# Patient Record
Sex: MALE | Race: WHITE
[De-identification: names, ages, dates, MRNs, and addresses within clinical notes are randomized per-mention and may not be internally consistent; named-entity substitution may affect disease eponyms.]

---

## 2017-10-14 ENCOUNTER — HOSPITAL ENCOUNTER (EMERGENCY)
Dept: HOSPITAL 74 - FER | Age: 32
Discharge: HOME | End: 2017-10-14
Payer: COMMERCIAL

## 2017-10-14 VITALS — DIASTOLIC BLOOD PRESSURE: 90 MMHG | HEART RATE: 89 BPM | TEMPERATURE: 98.9 F | SYSTOLIC BLOOD PRESSURE: 137 MMHG

## 2017-10-14 VITALS — BODY MASS INDEX: 34.9 KG/M2

## 2017-10-14 DIAGNOSIS — K52.9: Primary | ICD-10-CM

## 2017-10-14 LAB
ALBUMIN SERPL-MCNC: 4.7 G/DL (ref 3.5–5)
ALP SERPL-CCNC: 47 U/L (ref 32–92)
ALT SERPL-CCNC: 31 U/L (ref 10–40)
ANION GAP SERPL CALC-SCNC: 8 MMOL/L (ref 8–16)
AST SERPL-CCNC: 21 U/L (ref 10–42)
BASOPHILS # BLD: 2.9 % (ref 0–2)
BILIRUB SERPL-MCNC: 0.6 MG/DL (ref 0.2–1)
CALCIUM SERPL-MCNC: 9.3 MG/DL (ref 8.4–10.2)
CK SERPL-CCNC: 186 IU/L (ref 39–308)
CO2 SERPL-SCNC: 23 MMOL/L (ref 22–28)
COLOR UR: (no result)
CREAT SERPL-MCNC: 1 MG/DL (ref 0.6–1.3)
DEPRECATED RDW RBC AUTO: 13 % (ref 11.9–15.9)
EOSINOPHIL # BLD: 0.1 % (ref 0–4.5)
GLUCOSE SERPL-MCNC: 142 MG/DL (ref 74–106)
MCH RBC QN AUTO: 28.7 PG (ref 25.7–33.7)
MCHC RBC AUTO-ENTMCNC: 33.8 G/DL (ref 32–35.9)
MCV RBC: 85 FL (ref 80–96)
NEUTROPHILS # BLD: 80.2 % (ref 42.8–82.8)
PH UR: 7 [PH] (ref 4.5–8)
PLATELET # BLD AUTO: 183 K/MM3 (ref 134–434)
PMV BLD: 8.9 FL (ref 7.5–11.1)
PROT SERPL-MCNC: 7.9 G/DL (ref 6.4–8.3)
SP GR UR: 1.02 (ref 1–1.02)
TROPONIN I SERPL-MCNC: < 0.03 NG/ML (ref 0.03–0.5)
UROBILINOGEN UR STRIP-MCNC: 0.2 MG/DL (ref 0.2–1)
WBC # BLD AUTO: 8.5 K/MM3 (ref 4–10.8)

## 2017-10-14 PROCEDURE — 3E033GC INTRODUCTION OF OTHER THERAPEUTIC SUBSTANCE INTO PERIPHERAL VEIN, PERCUTANEOUS APPROACH: ICD-10-PCS

## 2017-10-14 PROCEDURE — 3E033NZ INTRODUCTION OF ANALGESICS, HYPNOTICS, SEDATIVES INTO PERIPHERAL VEIN, PERCUTANEOUS APPROACH: ICD-10-PCS

## 2017-10-14 NOTE — PDOC
History of Present Illness





- General


Chief Complaint: Chest Pain


Stated Complaint: CHEST PAIN


Time Seen by Provider: 10/14/17 11:53





- History of Present Illness


Initial Comments: 


10/14/17 13:12


Chief complaint: Epigastric pain





History of present illness: Patient complains of severe epigastric pain since 

this morning described as a "burning" just below the xiphoid. This extends to 

the right upper quadrant. There is mild nausea, no vomiting, and a normal bowel 

movement occurred this morning. There is been no recent hematemesis, melena, or 

bloody stool. The patient has a history of severe GERD, is under the care of 

her gastroenterologist, but has required parenteral analgesics in the past for 

this condition.





Review of systems: Denies chest pain, shortness of breath, diaphoresis, 

radiation of pain to the shoulders arms or jaw, vomiting, diarrhea, constipation

, hematemesis, melena, bloody stool, urinary tract symptoms, visual or focal 

neurologic symptoms, unsteadiness of gait. Remainder systems reviewed and found 

to be negative





Past medical history: GERD, as noted above. Otherwise healthy. Has had GI 

evaluation including gallbladder sono, HIDA scan, CT scans, and endoscopy in 

the past without definite diagnosis made.





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 

drugs. Fully active without disability





Family history: Significant for coronary artery disease and diabetes.





Physical exam: Alert and oriented 3, well-developed well-nourished, moderate 

distress due to epigastric pain. Cooperative


Afebrile, vital signs normal


No pallor or icterus. PERRLA, fundi benign, ENT clear.


Neck supple without bruit mass or nodes


Lungs clear to P&A


CV regular without murmur rub or gallop pulses full and symmetric no JVD or 

edema


Abdomen mildly distended, soft without organomegaly. There is moderate 

tenderness in the epigastrium and right upper quadrant with a positive Aragon 

sign. There is no lower quadrant tenderness and no CVAT


Neurological intact. Gait stable and unimpaired


Skin turgor adequate, no rash, mucous membranes wet


Extremities no CCE





Impression: Probable severe gastritis, GERD, rule out gallbladder or liver 

disease





Plan: Analgesics, labs for further diagnosis, ultrasound of the gallbladder, 

and further evaluation depending on results.





10/14/17 14:40








Past History





- Past Medical History


Allergies/Adverse Reactions: 


 Allergies











Allergy/AdvReac Type Severity Reaction Status Date / Time


 


No Known Allergies Allergy   Verified 10/14/17 11:43











Home Medications: 


Ambulatory Orders





Acetaminophen W/ Codeine #3 [Tylenol # 3] 2 combo PO Q4H PRN #14 tablet MDD 8 10

/14/17 








GI Disorders: Yes (GERD)


Kidney Stones: Yes





- Immunization History


Immunization Up to Date: No





- Suicide/Smoking/Psychosocial Hx


Smoking History: Never smoked


Have you smoked in the past 12 months: No


Hx Alcohol Use: No


Drug/Substance Use Hx: No


Substance Use Type: None





*Physical Exam





- Vital Signs


 Last Vital Signs











Temp Pulse Resp BP Pulse Ox


 


 98.9 F   89   18   137/90   97 


 


 10/14/17 11:40  10/14/17 11:40  10/14/17 11:40  10/14/17 11:40  10/14/17 11:40














ED Treatment Course





- LABORATORY


CBC & Chemistry Diagram: 


 10/14/17 12:10





 10/14/17 12:10





- ADDITIONAL ORDERS


Additional order review: 


 Laboratory  Results











  10/14/17 10/14/17





  13:30 12:10


 


Sodium   136


 


Potassium   3.7


 


Chloride   105


 


Carbon Dioxide   23


 


Anion Gap   8


 


BUN   15


 


Creatinine   1.0


 


Creat Clearance w eGFR   > 60


 


Random Glucose   142 H


 


Calcium   9.3


 


Total Bilirubin   0.6


 


AST   21


 


ALT   31


 


Alkaline Phosphatase   47


 


Creatine Kinase   186


 


Creatine Kinase Index   1.7


 


CK-MB (CK-2)   3.2


 


Troponin I   < 0.03 L


 


Total Protein   7.9


 


Albumin   4.7


 


Urine Color  [place] 


 


Urine Appearance  Clear 


 


Urine pH  7.0 


 


Ur Specific Gravity  1.020 


 


Urine Protein  Negative 


 


Urine Glucose (UA)  Negative 


 


Urine Ketones  Negative 


 


Urine Blood  Negative 


 


Urine Nitrite  Negative 


 


Urine Bilirubin  Negative 


 


Urine Urobilinogen  0.2 


 


Ur Leukocyte Esterase  Negative 








 











  10/14/17





  12:10


 


RBC  5.28


 


MCV  85.0


 


MCHC  33.8


 


RDW  13.0


 


MPV  8.9


 


Neutrophils %  80.2


 


Lymphocytes %  13.5


 


Monocytes %  3.3 L


 


Eosinophils %  0.1


 


Basophils %  2.9 H














- RADIOLOGY


Radiology Studies Ordered: 














 Category Date Time Status


 


 ABDOMEN FLAT & UPRIGHT [RAD] Stat Radiology  10/14/17 15:03 Completed


 


 ABDOMEN US -LIMITED [US] Stat Ultrasound  10/14/17 12:24 Completed














- Medications


Given in the ED: 


ED Medications














Discontinued Medications














Generic Name Dose Route Start Last Admin





  Trade Name Freq  PRN Reason Stop Dose Admin


 


Hyoscyamine Sulfate  0.125 mg 10/14/17 14:17 10/14/17 14:20





  Levsin Odt -  PO 10/14/17 14:18  0.125 mg





  ONCE ONE   Administration


 


Pantoprazole Sodium 40 mg/  100 mls @ 200 mls/hr 10/14/17 11:56 10/14/17 12:12





  Sodium Chloride  IVPB 10/14/17 12:25  200 mls/hr





  ONCE ONE   Administration


 


Morphine Sulfate  6 mg 10/14/17 12:21 10/14/17 12:25





  Morphine Injection -  IVPUSH 10/14/17 12:22  6 mg





  ONCE ONE   Administration


 


Morphine Sulfate  4 mg 10/14/17 14:51 10/14/17 14:57





  Morphine Injection -  IVPUSH 10/14/17 14:52  4 mg





  ONCE ONE   Administration


 


Ondansetron HCl  4 mg 10/14/17 12:00 10/14/17 12:00





  Zofran Injection  IVPB 10/14/17 12:01  4 mg





  ONCE ONE   Administration














Medical Decision Making





- Medical Decision Making


10/14/17 13:19


White blood count normal. Remainder of CBC normal. Chemistries with a glucose 

of 142, normal LFTs and no other significant abnormalities. Troponin is 

negative.





Ultrasound pending. Patient is much more comfortable after Zofran, Protonix, 

and morphine.





10/14/17 14:40


Ultrasound shows multiple small gallstones, but no sign of acute cholecystitis. 

Biliary colic is still an outside possibility. No other abnormalities are seen.





The patient remains distended and suspect that the nature of the patient's pain 

is due to incomplete emptying the stomach and stomach distention. NG tube was 

recommended, but the patient refused. He has received morphine with partial 

relief.





10/14/17 14:42


EKG reveals normal sinus rhythm 90 per minute. There is a nonspecific ST-T wave 

abnormality in the inferior leads that have been present since at least 

September, 2015 on an old EKG. There is  incomplete right bundle-branch block 

which was also present on prior EKG. EKG is essentially unchanged. Troponin, as 

noted above, is negative. The patient symptoms are not consistent with cardiac 

disease.





10/14/17 16:13


Patient is feeling much better. Pain is resolving. The distention and right 

upper quadrant tenderness has subsided. He is instructed to resume his 

omeprazole, prescribed Tylenol with Codeine if pain recurs, to see his 

gastroenterologist for further evaluation and treatment, including endoscopy. 

To return to the emergency room if there is fever, vomiting, or more severe 

pain. Patient fully ambulatory and in no pain or other distress upon discharge 

with his wife to follow-up as directed





*DC/Admit/Observation/Transfer


Diagnosis at time of Disposition: 


 Gastroenteritis





- Discharge Dispostion


Disposition: HOME


Condition at time of disposition: Improved


Admit: No





- Prescriptions


Prescriptions: 


Acetaminophen W/ Codeine #3 [Tylenol # 3] 2 combo PO Q4H PRN #14 tablet MDD 8


 PRN Reason: Severe Pain





- Patient Instructions


Printed Discharge Instructions:  DI for Gastritis


Additional Instructions: 


Clear liquid diet tonight. Progressing as tolerated tomorrow. Resume 

omeprazole. Pain medication as needed.





If pain worsens or there is fever, vomiting, or diarrhea, return to ER. 

Otherwise see her gastroenterologist and consider repeat endoscopy.





- Post Discharge Activity


Forms/Work/School Notes:  Back to Work

## 2017-10-15 NOTE — EKG
Test Reason : 

Blood Pressure : ***/*** mmHG

Vent. Rate : 090 BPM     Atrial Rate : 090 BPM

   P-R Int : 164 ms          QRS Dur : 094 ms

    QT Int : 366 ms       P-R-T Axes : 029 072 004 degrees

   QTc Int : 447 ms

 

SINUS RHYTHM

POSSIBLE LEFT ATRIAL ENLARGEMENT

NONSPECIFIC ST AND T WAVE ABNORMALITY

ABNORMAL ECG

WHEN COMPARED WITH ECG OF 28-SEP-2015 21:59,

RSR' pattern is no longer present in V1-2

Confirmed by RACHEL CASTRO, VERNON ([id number]) on 10/15/2017 4:44:36 PM

 

Referred By: LEN COPE           Confirmed By:VERNON RAMOS MD

## 2018-07-22 ENCOUNTER — HOSPITAL ENCOUNTER (EMERGENCY)
Dept: HOSPITAL 74 - JER | Age: 33
Discharge: HOME | End: 2018-07-22
Payer: COMMERCIAL

## 2018-07-22 VITALS — HEART RATE: 79 BPM | SYSTOLIC BLOOD PRESSURE: 102 MMHG | DIASTOLIC BLOOD PRESSURE: 60 MMHG | TEMPERATURE: 97.9 F

## 2018-07-22 VITALS — BODY MASS INDEX: 33.4 KG/M2

## 2018-07-22 DIAGNOSIS — K21.9: ICD-10-CM

## 2018-07-22 DIAGNOSIS — K80.50: Primary | ICD-10-CM

## 2018-07-22 LAB
ALBUMIN SERPL-MCNC: 4.2 G/DL (ref 3.4–5)
ALP SERPL-CCNC: 59 U/L (ref 45–117)
ALT SERPL-CCNC: 42 U/L (ref 12–78)
ANION GAP SERPL CALC-SCNC: 9 MMOL/L (ref 8–16)
APPEARANCE UR: (no result)
AST SERPL-CCNC: 23 U/L (ref 15–37)
BASOPHILS # BLD: 0.1 % (ref 0–2)
BILIRUB SERPL-MCNC: 0.3 MG/DL (ref 0.2–1)
BILIRUB UR STRIP.AUTO-MCNC: NEGATIVE MG/DL
BUN SERPL-MCNC: 15 MG/DL (ref 7–18)
CALCIUM SERPL-MCNC: 8.7 MG/DL (ref 8.5–10.1)
CHLORIDE SERPL-SCNC: 105 MMOL/L (ref 98–107)
CO2 SERPL-SCNC: 25 MMOL/L (ref 21–32)
COLOR UR: YELLOW
CREAT SERPL-MCNC: 1.1 MG/DL (ref 0.7–1.3)
DEPRECATED RDW RBC AUTO: 14.5 % (ref 11.9–15.9)
EOSINOPHIL # BLD: 0.4 % (ref 0–4.5)
GLUCOSE SERPL-MCNC: 143 MG/DL (ref 74–106)
HCT VFR BLD CALC: 42.5 % (ref 35.4–49)
HGB BLD-MCNC: 14.6 GM/DL (ref 11.7–16.9)
KETONES UR QL STRIP: NEGATIVE
LEUKOCYTE ESTERASE UR QL STRIP.AUTO: NEGATIVE
LIPASE SERPL-CCNC: 186 U/L (ref 73–393)
LYMPHOCYTES # BLD: 11.6 % (ref 8–40)
MCH RBC QN AUTO: 29.2 PG (ref 25.7–33.7)
MCHC RBC AUTO-ENTMCNC: 34.4 G/DL (ref 32–35.9)
MCV RBC: 85 FL (ref 80–96)
MONOCYTES # BLD AUTO: 5.9 % (ref 3.8–10.2)
MUCOUS THREADS URNS QL MICRO: (no result)
NEUTROPHILS # BLD: 82 % (ref 42.8–82.8)
NITRITE UR QL STRIP: NEGATIVE
PH UR: 5 [PH] (ref 5–8)
PLATELET # BLD AUTO: 132 K/MM3 (ref 134–434)
PMV BLD: 8.2 FL (ref 7.5–11.1)
POTASSIUM SERPLBLD-SCNC: 4.1 MMOL/L (ref 3.5–5.1)
PROT SERPL-MCNC: 7.7 G/DL (ref 6.4–8.2)
PROT UR QL STRIP: (no result)
PROT UR QL STRIP: NEGATIVE
RBC # BLD AUTO: 5 M/MM3 (ref 4–5.6)
SODIUM SERPL-SCNC: 139 MMOL/L (ref 136–145)
SP GR UR: 1.03 (ref 1–1.03)
UROBILINOGEN UR STRIP-MCNC: NEGATIVE MG/DL (ref 0.2–1)
WBC # BLD AUTO: 6.2 K/MM3 (ref 4–10)

## 2018-07-22 PROCEDURE — 3E033GC INTRODUCTION OF OTHER THERAPEUTIC SUBSTANCE INTO PERIPHERAL VEIN, PERCUTANEOUS APPROACH: ICD-10-PCS

## 2018-07-22 PROCEDURE — 3E033NZ INTRODUCTION OF ANALGESICS, HYPNOTICS, SEDATIVES INTO PERIPHERAL VEIN, PERCUTANEOUS APPROACH: ICD-10-PCS

## 2018-07-22 PROCEDURE — 3E0337Z INTRODUCTION OF ELECTROLYTIC AND WATER BALANCE SUBSTANCE INTO PERIPHERAL VEIN, PERCUTANEOUS APPROACH: ICD-10-PCS

## 2018-07-22 NOTE — EKG
Test Reason : 

Blood Pressure : ***/*** mmHG

Vent. Rate : 090 BPM     Atrial Rate : 090 BPM

   P-R Int : 158 ms          QRS Dur : 096 ms

    QT Int : 380 ms       P-R-T Axes : 036 074 016 degrees

   QTc Int : 464 ms

 

*** POOR DATA QUALITY, INTERPRETATION MAY BE ADVERSELY AFFECTED

NORMAL SINUS RHYTHM

NORMAL ECG

WHEN COMPARED WITH ECG OF 14-OCT-2017 11:46,

NO SIGNIFICANT CHANGE WAS FOUND

Confirmed by DOUG CASTRO, HARPAL ([id number]) on 7/22/2018 3:20:35 PM

 

Referred By:             Confirmed By:HARPAL CAMACHO MD

## 2018-07-22 NOTE — PDOC
Attending Attestation





- Resident


Resident Name: Rafita Chávez





- ED Attending Attestation


I have performed the following: I have examined & evaluated the patient, The 

case was reviewed & discussed with the resident, I agree w/resident's findings 

& plan, Exceptions are as noted





- HPI


HPI: 





07/22/18 07:32





33 year old male c/ hx of GERD, gallstones presents with upper abdominal pain 

since 3 am.


The patient had a heavy meal yesterday night including fried chicken.


Went to bed feeling well, but woke up at 3 am with severe burning epigastric 

pain radiating into the mid chest.


Denies SOB, but reported nausea and 2 episodes of vomiting.


Reportedly had a small amounts of flecks of blood on vomitus.


Denies fevers, chills. Denies alcohol use, dysuria, hematuria.


Pt had taken zantac overnight but did not improve the pain.


Came into ED feeling uncomfortable.








- Physicial Exam


PE: 





07/22/18 07:47





GENERAL: Awake, alert, and fully oriented. +uncomfortable appearing


HEAD: No signs of trauma


EYES: EOMI, sclera anicteric, conjunctiva clear


ENT: Auricles normal inspection, hearing grossly normal, nares patent


NECK: Normal ROM, supple


ABDOMEN: Soft.  No guarding, no rebound.  No masses. + TTP epigastric, LUQ, and 

RUQ. 


EXTREMITIES: Normal range of motion, no edema.  No clubbing or cyanosis. No 

cords, erythema, or tenderness


NEUROLOGICAL: Cranial nerves II through XII grossly intact.  Normal speech, 

normal gait


SKIN: Warm, Dry, normal turgor, no rashes or lesions noted.





- Medical Decision Making





07/22/18 07:50





 Vital Signs











Temp Pulse Resp BP Pulse Ox


 


 98.1 F   87   19   144/84   100 


 


 07/22/18 06:30  07/22/18 06:30  07/22/18 06:30  07/22/18 06:30  07/22/18 06:30








Differential includes acute cholecystitis, biliary colic, gastritis, 

pancreatitis, vs. less likely ACS.


I agree with the residents plan for RUQ ultrasound, labs including troponin and 

lipase.


Pain control and reasses.


07/22/18 10:38





 CBC, BMP





 07/22/18 07:52 





 07/22/18 08:10 





 CMP











Sodium  139 mmol/L (136-145)   07/22/18  08:10    


 


Potassium  4.1 mmol/L (3.5-5.1)   07/22/18  08:10    


 


Chloride  105 mmol/L ()   07/22/18  08:10    


 


Carbon Dioxide  25 mmol/L (21-32)   07/22/18  08:10    


 


Anion Gap  9  (8-16)   07/22/18  08:10    


 


BUN  15 mg/dL (7-18)   07/22/18  08:10    


 


Creatinine  1.1 mg/dL (0.7-1.3)   07/22/18  08:10    


 


Creat Clearance w eGFR  > 60  (>60)   07/22/18  08:10    


 


Random Glucose  143 mg/dL ()  H D 07/22/18  08:10    


 


Calcium  8.7 mg/dL (8.5-10.1)   07/22/18  08:10    


 


Total Bilirubin  0.3 mg/dL (0.2-1.0)   07/22/18  08:10    


 


AST  23 U/L (15-37)  D 07/22/18  08:10    


 


ALT  42 U/L (12-78)  D 07/22/18  08:10    


 


Alkaline Phosphatase  59 U/L ()   07/22/18  08:10    


 


Creatine Kinase  258 IU/L ()   07/22/18  08:10    


 


Creatine Kinase Index  0.9 % (0.0-5.0)   07/22/18  08:10    


 


CK-MB (CK-2)  2.41 ng/mL (0.5-3.6)   07/22/18  08:10    


 


Troponin I  < 0.02 ng/ml (0.00-0.05)   07/22/18  08:10    


 


Total Protein  7.7 g/dl (6.4-8.2)   07/22/18  08:10    


 


Albumin  4.2 g/dl (3.4-5.0)   07/22/18  08:10    


 


Lipase  186 U/L ()   07/22/18  08:10    








Ultrasound demonstrates gallstones but no acute cholecystitis.


Pt likely with biliary colic.


however, the patient reports feeling better and would like to pursue outpatient 

management with the general surgeon.


he feels more comfortable and will go home with family.





**Heart Score/ECG Review





- History


History: Slightly suspicious





- Electrocardiogram


EKG: Normal





- Age


Age: 45-65





- Risk Factors


Risk Factors Heart Score: Yes Hx Obesity


Based on the list above the patient has:: 1-2 risk factors


  ** #1


ECG reviewed & interpreted by me at: 06:45





07/22/18 07:50


NSR 90, no std/jun, normal axis, normal intervals, TWI III,  msec

## 2018-07-22 NOTE — PDOC
History of Present Illness





- General


Chief Complaint: Chest Pain


Stated Complaint: CHEST AND ABDOMINAL PAIN


Time Seen by Provider: 07/22/18 07:01


History Source: Patient


Exam Limitations: No Limitations





- History of Present Illness


Initial Comments: 





07/22/18 07:17


Mr. Varela is a 34 yo M with a hx of GERD who presents with epigastric pain 

since 3am today. The pain woke him up and is described as a burning crushing 

pain that originates in the epigastric region and radiates bilaterally to his 

back. It is constant, 10/10 without specific aggravating factors and denies 

relief with antacid and xanax. Yesterday he consumed soda and nachos for lunch 

and fried chicken for dinner approximately ay 5pm. He endorses having 2x 

vomiting events since then with "small amount of red blood" last one within the 

hour and had acid reflux concurrent to awakening with the pain. Denies the 

following: SOB, fever, radiating pain to the arms, melena, recent alcohol use, 

dysuria, hematuria, lower abdominal pain, and lightheadedness. Endorses having 

gallstones.





Pmhx: GERD. Denies cardiac and respiratory disease hx.


Shx: None


Meds: None


Allergies: None


Social hx: Denies smoking, rare alcohol consumption, and substance abuse. 


PCP: Previously seen by Dr. Christopher Norman at [facility] for GI. Last visit 2 years 

ago for endoscopy and colonscopy; no abnormalities detected.


07/22/18 07:26





07/22/18 08:19








Past History





- Past Medical History


Allergies/Adverse Reactions: 


 Allergies











Allergy/AdvReac Type Severity Reaction Status Date / Time


 


No Known Allergies Allergy   Verified 07/22/18 07:00











Home Medications: 


Ambulatory Orders





Famotidine [Pepcid -] 40 mg PO DAILY 07/22/18 


Ibuprofen 600 mg PO QID PRN #20 tablet 07/22/18 


Ondansetron HCl [Zofran] 8 mg PO TID PRN #15 tablet 07/22/18 


Oxycodone HCl/Acetaminophen [Percocet 5-325 mg Tablet] 1 tab PO Q6H PRN #15 

tablet MDD 4 07/22/18 








GI Disorders: Yes (GERD)


Kidney Stones: Yes





- Immunization History


Immunization Up to Date: No





- Suicide/Smoking/Psychosocial Hx


Smoking History: Never smoked


Have you smoked in the past 12 months: No


Information on smoking cessation initiated: No


Hx Alcohol Use: No


Drug/Substance Use Hx: No


Substance Use Type: None





**Review of Systems





- Review of Systems


Able to Perform ROS?: Yes


Constitutional: Yes: Diaphoresis.  No: Chills, Fever


HEENTM: No: Recent change in vision, Ear Pain, Nose Pain, Throat Pain


Respiratory: No: Cough, Shortness of Breath


Cardiac (ROS): No: Chest Pain, Lightheadedness, Palpitations


ABD/GI: Yes: Nausea, Vomiting (Endorses having 2 events with "small amount of 

red blood").  No: Abdominal Distended, Blood Streaked Bowels, Constipated, 

Diarrhea, Rectal Bleeding, Abdominal cramping, Tarry Stools


: No: Burning, Dysuria, Flank Pain, Hematuria


Musculoskeletal: No: Back Pain, Joint Pain


Integumentary: No: Rash


Neurological: No: Headache


Psychiatric: Yes: Stressors (mother is ill)


Endocrine: No: Unexplained Weight Gain





*Physical Exam





- Vital Signs


 Last Vital Signs











Temp Pulse Resp BP Pulse Ox


 


 98.1 F   87   19   144/84   100 


 


 07/22/18 06:30  07/22/18 06:30  07/22/18 06:30  07/22/18 06:30  07/22/18 06:30














- Physical Exam


General Appearance: Yes: Nourished, Appropriately Dressed


HEENT: positive: Normal Voice


Respiratory/Chest: positive: Lungs Clear, Normal Breath Sounds


Cardiovascular: positive: Regular Rhythm, Regular Rate, S1, S2, Systolic Murmur


Gastrointestinal/Abdominal: positive: Normal Bowel Sounds, Tender (epigastric, 

LUQ tenderness)


Musculoskeletal: positive: Normal Inspection.  negative: CVA Tenderness


Extremity: positive: Normal Inspection


Integumentary: positive: Normal Color, Dry, Warm


Neurologic: positive: Fully Oriented, Alert





ED Treatment Course





- LABORATORY


CBC & Chemistry Diagram: 


 07/22/18 07:52





 07/22/18 08:10





Medical Decision Making





- Medical Decision Making





07/22/18 08:23


Mr. Varela is a 34 yo M with a hx of GERD presents to the emergency department 

with epigastric pain with onset at 3am this morning. It radiates to the back 

bilaterally and has a hx of GERD and cholilithiasis





Initial vitals:





 Initial Vital Signs











Temp Pulse Resp BP Pulse Ox


 


 98.1 F   87   19   144/84   100 


 


 07/22/18 06:30  07/22/18 06:30  07/22/18 06:30  07/22/18 06:30  07/22/18 06:30








Work up


CBC, CMP, lipase, UA, urine culture, EKG, and troponins were ordered. In 

addition, a RUQ ultrasound was ordered. RUQ US showed cholilithiasis and fatty 

infiltrate in the liver. EKG was within normal limits. Regular rate, and rhythm 

with no signs of ST elevation/depression with repolarization abnormalities. 











 Laboratory Results - last 24 hr











  07/22/18 07/22/18 07/22/18





  07:52 08:10 08:50


 


WBC  6.2  


 


RBC  5.00  


 


Hgb  14.6  


 


Hct  42.5  


 


MCV  85.0  


 


MCH  29.2  


 


MCHC  34.4  


 


RDW  14.5  


 


Plt Count  132 L D  


 


MPV  8.2  


 


Absolute Neuts (auto)  5.1  


 


Neutrophils %  82.0  


 


Lymphocytes %  11.6  D  


 


Monocytes %  5.9  


 


Eosinophils %  0.4  D  


 


Basophils %  0.1  


 


Nucleated RBC %  0  


 


Sodium   139 


 


Potassium   4.1 


 


Chloride   105 


 


Carbon Dioxide   25 


 


Anion Gap   9 


 


BUN   15 


 


Creatinine   1.1 


 


Creat Clearance w eGFR   > 60 


 


Random Glucose   143 H D 


 


Calcium   8.7 


 


Total Bilirubin   0.3 


 


AST   23  D 


 


ALT   42  D 


 


Alkaline Phosphatase   59 


 


Creatine Kinase   258 


 


Creatine Kinase Index   0.9 


 


CK-MB (CK-2)   2.41 


 


Troponin I   < 0.02 


 


Total Protein   7.7 


 


Albumin   4.2 


 


Lipase   186 


 


Urine Color    Yellow


 


Urine Appearance    Slcloudy


 


Urine pH    5.0


 


Ur Specific Gravity    1.026


 


Urine Protein    1+ H


 


Urine Glucose (UA)    Negative


 


Urine Ketones    Negative


 


Urine Blood    1+ H


 


Urine Nitrite    Negative


 


Urine Bilirubin    Negative


 


Urine Urobilinogen    Negative


 


Ur Leukocyte Esterase    Negative


 


Urine WBC (Auto)    1


 


Urine RBC (Auto)    <1


 


Urine Mucus    Rare








 


07/22/18 11:23








*DC/Admit/Observation/Transfer


Diagnosis at time of Disposition: 


 Biliary colic








- Discharge Dispostion


Disposition: HOME


Decision to Admit order: No





- Prescriptions


Prescriptions: 


Ibuprofen 600 mg PO QID PRN #20 tablet


 PRN Reason: Pain


Ondansetron HCl [Zofran] 8 mg PO TID PRN #15 tablet


 PRN Reason: Nausea


Oxycodone HCl/Acetaminophen [Percocet 5-325 mg Tablet] 1 tab PO Q6H PRN #15 

tablet MDD 4


 PRN Reason: Severe Pain





- Referrals


Referrals: 


Lopez Thomson MD [Staff Physician] - 





- Patient Instructions


Printed Discharge Instructions:  DI for Biliary Colic


Additional Instructions: 


You have been diagnosed with biliary colic. Please return to the emergency 

department if pain worsens or other concerning symptoms arise. Do not operate a 

vehicle or a machinery while taking percocet. In addition, do not consume 

alcohol while taking percocet. Follow up with the referred doctor for surgical 

consultation and followup. 





- Post Discharge Activity

## 2019-04-22 ENCOUNTER — HOSPITAL ENCOUNTER (EMERGENCY)
Dept: HOSPITAL 74 - JER | Age: 34
Discharge: HOME | End: 2019-04-22
Payer: COMMERCIAL

## 2019-04-22 VITALS — HEART RATE: 92 BPM | SYSTOLIC BLOOD PRESSURE: 136 MMHG | TEMPERATURE: 98.1 F | DIASTOLIC BLOOD PRESSURE: 86 MMHG

## 2019-04-22 VITALS — BODY MASS INDEX: 30.8 KG/M2

## 2019-04-22 DIAGNOSIS — K80.20: Primary | ICD-10-CM

## 2019-04-22 LAB
ALBUMIN SERPL-MCNC: 4.3 G/DL (ref 3.4–5)
ALP SERPL-CCNC: 55 U/L (ref 45–117)
ALT SERPL-CCNC: 33 U/L (ref 13–61)
ANION GAP SERPL CALC-SCNC: 7 MMOL/L (ref 8–16)
APPEARANCE UR: CLEAR
AST SERPL-CCNC: 13 U/L (ref 15–37)
BILIRUB SERPL-MCNC: 0.3 MG/DL (ref 0.2–1)
BILIRUB UR STRIP.AUTO-MCNC: NEGATIVE MG/DL
BUN SERPL-MCNC: 15 MG/DL (ref 7–18)
CALCIUM SERPL-MCNC: 9 MG/DL (ref 8.5–10.1)
CHLORIDE SERPL-SCNC: 104 MMOL/L (ref 98–107)
CO2 SERPL-SCNC: 28 MMOL/L (ref 21–32)
COLOR UR: YELLOW
CREAT SERPL-MCNC: 1.1 MG/DL (ref 0.55–1.3)
DEPRECATED RDW RBC AUTO: 13.8 % (ref 11.9–15.9)
GLUCOSE SERPL-MCNC: 124 MG/DL (ref 74–106)
HCT VFR BLD CALC: 41.4 % (ref 35.4–49)
HGB BLD-MCNC: 14.4 GM/DL (ref 11.7–16.9)
KETONES UR QL STRIP: NEGATIVE
LEUKOCYTE ESTERASE UR QL STRIP.AUTO: NEGATIVE
LIPASE SERPL-CCNC: 234 U/L (ref 73–393)
MCH RBC QN AUTO: 30 PG (ref 25.7–33.7)
MCHC RBC AUTO-ENTMCNC: 34.8 G/DL (ref 32–35.9)
MCV RBC: 86.3 FL (ref 80–96)
NITRITE UR QL STRIP: NEGATIVE
PH UR: 8 [PH] (ref 5–8)
PLATELET # BLD AUTO: 151 K/MM3 (ref 134–434)
PMV BLD: 7.9 FL (ref 7.5–11.1)
POTASSIUM SERPLBLD-SCNC: 3.9 MMOL/L (ref 3.5–5.1)
PROT SERPL-MCNC: 8.3 G/DL (ref 6.4–8.2)
PROT UR QL STRIP: NEGATIVE
PROT UR QL STRIP: NEGATIVE
RBC # BLD AUTO: 4.8 M/MM3 (ref 4–5.6)
SODIUM SERPL-SCNC: 139 MMOL/L (ref 136–145)
SP GR UR: 1.02 (ref 1.01–1.03)
UROBILINOGEN UR STRIP-MCNC: 0.2 MG/DL (ref 0.2–1)
WBC # BLD AUTO: 7.6 K/MM3 (ref 4–10)

## 2019-04-22 PROCEDURE — 3E033NZ INTRODUCTION OF ANALGESICS, HYPNOTICS, SEDATIVES INTO PERIPHERAL VEIN, PERCUTANEOUS APPROACH: ICD-10-PCS

## 2019-04-22 PROCEDURE — BF43ZZZ ULTRASONOGRAPHY OF GALLBLADDER AND BILE DUCTS: ICD-10-PCS

## 2019-04-22 NOTE — EKG
Test Reason : 

Blood Pressure : ***/*** mmHG

Vent. Rate : 090 BPM     Atrial Rate : 090 BPM

   P-R Int : 166 ms          QRS Dur : 096 ms

    QT Int : 358 ms       P-R-T Axes : 028 061 007 degrees

   QTc Int : 437 ms

 

NORMAL SINUS RHYTHM

NORMAL ECG

WHEN COMPARED WITH ECG OF 22-JUL-2018 06:42,

NO SIGNIFICANT CHANGE WAS FOUND

Confirmed by ANATOLIY ESQUEDA MD ([id number]) on 4/22/2019 2:20:36 PM

 

Referred By:             Confirmed By:ANATOLIY ESQUEDA MD

## 2019-04-22 NOTE — PDOC
History of Present Illness





- General


Chief Complaint: Pain


Stated Complaint: CHEST PAIN/ABD PAIN


Time Seen by Provider: 04/22/19 07:38


History Source: Patient





- History of Present Illness


Initial Comments: 





04/22/19 07:41





The patient is a 33 year old male with a PMH of GERD presents to the ED this 

morning c/o acute onset of abdominal and chest pain.  Abdominal pain woke him 

from sleep at 3 a.m. and is sharp, constant, epigastric.  Last meal was roast 

pork around 8 p.m. and last BM was this morning and was normal.  Endorses three 

episodes of yellowish emesis after which he felt a chest pain.  Chest pain is L 

sided, non-radiating, burning and intermittent and lasts 2-3 months.  No 

associated shortness of breath, lightheadedness, palpitations.  C/w GERD chest 

pain.    H/o negative stress testing @ [facility] three years previous.  Family 

history significant for CABG in father and CHF in mother.  No early cardiac 

deaths in family.





NKDA


Surgical: Lipoma removal


Social: social alcohol, denies other toxic habits


PMD: Dr. Seay ([facility])








As per EMR, patient evaluated in ED in 07/2018 for similar complaint











Past History





- Past Medical History


Allergies/Adverse Reactions: 


 Allergies











Allergy/AdvReac Type Severity Reaction Status Date / Time


 


No Known Allergies Allergy   Verified 04/22/19 07:34











Home Medications: 


Ambulatory Orders





NK [No Known Home Medication]  04/22/19 








COPD: No


GI Disorders: Yes (GERD)


Kidney Stones: Yes





- Immunization History


Immunization Up to Date: No





- Suicide/Smoking/Psychosocial Hx


Smoking History: Never smoked


Have you smoked in the past 12 months: No


Information on smoking cessation initiated: No


Hx Alcohol Use: No


Drug/Substance Use Hx: No


Substance Use Type: None





**Review of Systems





- Review of Systems


Constitutional: No: Chills, Fever


HEENTM: No: Recent change in vision


Respiratory: No: Cough, Shortness of Breath


Cardiac (ROS): Yes: Chest Pain.  No: Lightheadedness, Palpitations, Syncope


ABD/GI: Yes: Nausea, Vomiting, Abdominal cramping.  No: Constipated, Diarrhea





*Physical Exam





- Vital Signs


 Last Vital Signs











Temp Pulse Resp BP Pulse Ox


 


 98.1 F   92 H  18   136/86   100 


 


 04/22/19 07:35  04/22/19 07:35  04/22/19 07:35  04/22/19 07:35  04/22/19 07:35














- Physical Exam


General Appearance: Yes: Nourished, Obese


HEENT: positive: Normal Voice, Hearing Grossly Normal


Neck: positive: Trachea midline, Supple


Respiratory/Chest: positive: Lungs Clear, Normal Breath Sounds.  negative: 

Labored Respiration, Rapid RR, Crackles, Wheezing


Cardiovascular: positive: S1, S2.  negative: Edema, JVD, Murmur


Vascular Pulses: Dorsalis-Pedis (R): 2+, Doralis-Pedis (L): 2+


Gastrointestinal/Abdominal: positive: Normal Bowel Sounds, Soft.  negative: 

Guarding, Rebound, Tenderness


Musculoskeletal: negative: CVA Tenderness (R), CVA Tenderness (L)


Extremity: positive: Normal Capillary Refill, Normal Inspection


Integumentary: positive: Normal Color, Dry, Warm


Neurologic: positive: Fully Oriented, Alert





ED Treatment Course





- LABORATORY


CBC & Chemistry Diagram: 


 04/22/19 08:32





 04/22/19 08:32





Medical Decision Making





- Medical Decision Making





04/22/19 08:18


33 year old non-toxic appearing male with epigastric abdominal pain, NBNB 

emesis associated with burning chest pain.  VS unremarkable. Belly exam shows 

epigastric TTP, no peritoneal signs. Frontal diagnosis: GERD vs. Gastritis vs. 

Biliary colic vs. Acute Cholecystitis.  Also consider early appendicitis, less 

likely SBO, mesenteric ischemic, early appy, pancreatitis.  Will r/o ACS as 

abdominal pain could present as anginal equivalent 


PLAN:


1. CBC, CMP, Lipase, Lactic Acid


2. Bedside U/S


3. IV hydration, pain control with Morphine


Reassess 





04/22/19 08:23


Bedside U/S shows poorly visualized normal sized GB with stones


(+) Aragon's sign





04/22/19 09:59


No leukocytosis, CMP unremarkable


My read of CT shows GB w/stone in neck, normal sized CBD, no anterior wall 

thickening





04/22/19 11:07


Patient reassessed @ bedside, repeat belly exam shows minimal TTP


Radiology contacted for U/S read





04/22/19 13:38


U/S shows cholelithiasis


Troponin (-) 


CBC, CMP unremarkable


CXR pending to r/o esophageal tear





Gallbladder U/S:


Compared to prior upper abdomen ultrasound dated 7/22/2018 The liver is 

enlarged measuring 20 cm in sagittal length with a slightly to moderately dense 

echotexture. Gallbladder is adequately distended with multiple intraluminal 

stones in the region of the gallbladder neck. There is borderline thickening of 

its wall without evidence of pericholecystic free fluid. No intra or 

extrahepatic bile duct dilatation is seen. The right kidney measures 10.9 cm 

sagittal length and appears unremarkable. Visualized portion of the pancreas 

appears unremarkable Visualized portion of the proximal abdominal aorta and 

inferior vena cava appear unremarkable. Normal flow in the main portal vein. 





IMPRESSION: Hepatomegaly with fatty infiltration versus hepatocellular disease. 

Please correlate with liver enzymes. Multiple small gallstones again seen in 

the region of the gallbladder neck with borderline thickening of the 

gallbladder wall and without evidence of pericholecystic free fluid to suggest 

acute cholecystitis. Correlate clinically to determine further evaluation and 

follow-up 





04/22/19 13:51


CXR negative for esophageal tear


Patient reassessed @ bedside


States he has previously been evaluated for his gallstones by a surgery @ [facility] (

Dr. Sung)


Counseled on importance of follow-up and discharged home with return 

precautions and copy of ultrasound report





I discussed the physical exam findings, final diagnoses and test results with 

the patient.  I answered all the patient's questions.  The patient understands 

his post discharge plan of care and will return to the Emergency Department 

with any new/worsening/concerning symptoms. 








*DC/Admit/Observation/Transfer


Diagnosis at time of Disposition: 


 Gallstones








- Discharge Dispostion


Disposition: HOME


Condition at time of disposition: Good





- Referrals


Referrals: 


Jose Khalil MD [Staff Physician] - 





- Patient Instructions


Printed Discharge Instructions:  DI for Gallstones


Additional Instructions: 


You were evaluated today for abdominal pain.  An ultrasound of your gallbladder 

showed gallstones.  You need follow-up with a surgeon for further evaluation.  

  We have provided a referral or you can call your insurance company for a list 

of general surgeons. We have given you a copy of your ultrasound report.  

Please take this to your follow-up evaluation.  Your care is not complete until 

you are evaluated by a surgeon.





Return to the Emergency Department for any new/worsening/concerning symptoms.





- Post Discharge Activity

## 2019-04-22 NOTE — PDOC
Attending Attestation





- Resident


Resident Name: Virginia Cash





- ED Attending Attestation


I have performed the following: I have examined & evaluated the patient, The 

case was reviewed & discussed with the resident, I agree w/resident's findings 

& plan, Exceptions are as noted





- HPI


HPI: 





04/22/19 08:28


34yo F hx GERD, cholelithiasis presents to the ED with sudden onset epigastric 

pain since 3am. Reports pain began in RUQ area, then migrated to epigsatric 

area. It has been sharp and constant. Pain has been associated with 3 episodes 

of NBNB emesis. Pt had normal BM 6am. REports some mild burning chest pain 

right after vomiting, but that has resolved. Ate roast pork last night which he 

states often sets off his gallbladder. 





Pt has had burning cp, on and off for years, had a cardiology evaluation with 

reported neg stress test at [facility] 3 years ago. 


No family hx cardiac disease at young age





DEnies fevers, chills, sob, headache, weaknes, numbness, LE edema. 





- Physicial Exam


PE: 





04/22/19 12:49


GENERAL: Awake, alert, and fully oriented, in no acute distress


EYES: PERRLA, EOMI, sclera anicteric, conjunctiva clear


ENT: Nares patent, oropharynx clear without exudates. Moist mucosa


NECK: Normal ROM, supple, no lymphadenopathy, JVD, or masses


LUNGS: Breath sounds equal, clear to auscultation bilaterally.  No wheezes, and 

no crackles


HEART: Regular rate and rhythm, normal S1 and S2, no murmurs, rubs or gallops


ABDOMEN: Soft, nontender, neg murphys sign, normoactive bowel sounds.  No 

guarding, no rebound.  No masses


EXTREMITIES: Normal range of motion, no edema.  No cords, erythema, or 

tenderness


NEUROLOGICAL:  Normal speech, cranial nerves intact, equal strength and 

sensation b/l


SKIN: Warm, Dry, normal turgor, no rashes or lesions noted.





- Medical Decision Making





04/22/19 13:00


34yo M hx GERD, cholelithiasis presents to the ED with RUQ -> epigastric pain, N

/V, and resolved burning epigastric pain


Vitals unremarkable


Pt non toxic appearing with no abd ttp, neg murphys (I examined pt after 

morphine, pt had +sonographic murphys on bedside sono by Dr. Cash during 

initial eval)


Labs unremarkable


US with cholelithiasis but no acute cholecystitis 


Likely biliary colic


Pt tolerating PO





With regards to burning CP, likely GERD. EKG non ischemic. CXR pending


UA pending


Will reassess


[facility] f/u with 





04/22/19 18:07


UA neg


CXR wnl on my read


pt requests DC home


Pt clinically stable for DC





**Heart Score/ECG Review


  ** #1





04/22/19 13:00


Twelve-lead EKG was performed and reviewed by me. Normal sinus rhythm, rate 90. 

Normal axis and intervals. No ST elevations

## 2019-07-16 ENCOUNTER — HOSPITAL ENCOUNTER (EMERGENCY)
Dept: HOSPITAL 74 - JER | Age: 34
Discharge: HOME | End: 2019-07-16
Payer: COMMERCIAL

## 2019-07-16 VITALS — TEMPERATURE: 99.1 F | DIASTOLIC BLOOD PRESSURE: 73 MMHG | HEART RATE: 82 BPM | SYSTOLIC BLOOD PRESSURE: 139 MMHG

## 2019-07-16 VITALS — BODY MASS INDEX: 32.3 KG/M2

## 2019-07-16 DIAGNOSIS — N20.0: Primary | ICD-10-CM

## 2019-07-16 DIAGNOSIS — K80.50: ICD-10-CM

## 2019-07-16 LAB
ALBUMIN SERPL-MCNC: 4.7 G/DL (ref 3.4–5)
ALP SERPL-CCNC: 55 U/L (ref 45–117)
ALT SERPL-CCNC: 68 U/L (ref 13–61)
ANION GAP SERPL CALC-SCNC: 3 MMOL/L (ref 8–16)
APPEARANCE UR: CLEAR
AST SERPL-CCNC: 23 U/L (ref 15–37)
BASOPHILS # BLD: 0.7 % (ref 0–2)
BILIRUB SERPL-MCNC: 0.4 MG/DL (ref 0.2–1)
BILIRUB UR STRIP.AUTO-MCNC: NEGATIVE MG/DL
BUN SERPL-MCNC: 15.2 MG/DL (ref 7–18)
CALCIUM SERPL-MCNC: 9.5 MG/DL (ref 8.5–10.1)
CHLORIDE SERPL-SCNC: 105 MMOL/L (ref 98–107)
CO2 SERPL-SCNC: 30 MMOL/L (ref 21–32)
COLOR UR: YELLOW
CREAT SERPL-MCNC: 1.1 MG/DL (ref 0.55–1.3)
DEPRECATED RDW RBC AUTO: 14 % (ref 11.9–15.9)
EOSINOPHIL # BLD: 0.6 % (ref 0–4.5)
GLUCOSE SERPL-MCNC: 127 MG/DL (ref 74–106)
HCT VFR BLD CALC: 43.2 % (ref 35.4–49)
HGB BLD-MCNC: 14.9 GM/DL (ref 11.7–16.9)
KETONES UR QL STRIP: NEGATIVE
LEUKOCYTE ESTERASE UR QL STRIP.AUTO: NEGATIVE
LIPASE SERPL-CCNC: 220 U/L (ref 73–393)
LYMPHOCYTES # BLD: 15.3 % (ref 8–40)
MCH RBC QN AUTO: 29.5 PG (ref 25.7–33.7)
MCHC RBC AUTO-ENTMCNC: 34.6 G/DL (ref 32–35.9)
MCV RBC: 85.3 FL (ref 80–96)
MONOCYTES # BLD AUTO: 4.5 % (ref 3.8–10.2)
NEUTROPHILS # BLD: 78.9 % (ref 42.8–82.8)
NITRITE UR QL STRIP: NEGATIVE
PH UR: 6 [PH] (ref 5–8)
PLATELET # BLD AUTO: 189 K/MM3 (ref 134–434)
PMV BLD: 8.1 FL (ref 7.5–11.1)
POTASSIUM SERPLBLD-SCNC: 4.1 MMOL/L (ref 3.5–5.1)
PROT SERPL-MCNC: 8.2 G/DL (ref 6.4–8.2)
PROT UR QL STRIP: NEGATIVE
PROT UR QL STRIP: NEGATIVE
RBC # BLD AUTO: 5.06 M/MM3 (ref 4–5.6)
SODIUM SERPL-SCNC: 139 MMOL/L (ref 136–145)
SP GR UR: 1.02 (ref 1.01–1.03)
UROBILINOGEN UR STRIP-MCNC: 0.2 MG/DL (ref 0.2–1)
WBC # BLD AUTO: 7.7 K/MM3 (ref 4–10)

## 2019-07-16 PROCEDURE — 3E033NZ INTRODUCTION OF ANALGESICS, HYPNOTICS, SEDATIVES INTO PERIPHERAL VEIN, PERCUTANEOUS APPROACH: ICD-10-PCS

## 2019-07-16 PROCEDURE — 3E033GC INTRODUCTION OF OTHER THERAPEUTIC SUBSTANCE INTO PERIPHERAL VEIN, PERCUTANEOUS APPROACH: ICD-10-PCS

## 2019-07-16 PROCEDURE — 3E0333Z INTRODUCTION OF ANTI-INFLAMMATORY INTO PERIPHERAL VEIN, PERCUTANEOUS APPROACH: ICD-10-PCS

## 2019-07-16 NOTE — PDOC
History of Present Illness





- General


History Source: Patient


Exam Limitations: No Limitations





<Glenny Horan - Last Filed: 07/16/19 16:30>





<Sindy Mendez - Last Filed: 07/19/19 09:18>





- General


Chief Complaint: Pain


Stated Complaint: CHEST PAIN


Time Seen by Provider: 07/16/19 09:40





Past History





- Travel


Traveled outside of the country in the last 30 days: No


Close contact w/someone who was outside of country & ill: No





- Past Medical History


COPD: No


GI Disorders: Yes (GERD)


Kidney Stones: Yes





- Immunization History


Immunization Up to Date: No





- Suicide/Smoking/Psychosocial Hx


Smoking History: Never smoked


Have you smoked in the past 12 months: No


Hx Alcohol Use: No


Drug/Substance Use Hx: No


Substance Use Type: None





<Glenny Horan - Last Filed: 07/16/19 16:30>





<Sindy Mendez - Last Filed: 07/19/19 09:18>





- Past Medical History


Allergies/Adverse Reactions: 


 Allergies











Allergy/AdvReac Type Severity Reaction Status Date / Time


 


No Known Allergies Allergy   Verified 04/22/19 07:34











Home Medications: 


Ambulatory Orders





Ibuprofen 800 mg PO TID #30 tablet 07/16/19 


Oxycodone HCl/Acetaminophen [Percocet 5-325 mg Tablet] 1 tab PO Q6H #10 tablet 

MDD 4 07/16/19 


Tamsulosin HCl [Flomax] 0.4 mg PO DAILY #10 capsule 07/16/19 











**Review of Systems





- Review of Systems


Able to Perform ROS?: Yes


Comments:: 





07/16/19 09:58


CONSTITUTIONAL: 


Absent: fever, chills, diaphoresis, generalized weakness, malaise, loss of 

appetite


HEENT: 


Absent: rhinorrhea, nasal congestion, throat pain, throat swelling, difficulty 

swallowing, mouth swelling, ear pain, eye pain, visual Changes


CARDIOVASCULAR: 


Absent: chest pain, loss of consciousness, palpitations, irregular heart rate, 

peripheral edema


RESPIRATORY: 


Absent: cough, shortness of breath, dyspnea with exertion, orthopnea, wheezing, 

stridor, hemoptysis


GASTROINTESTINAL:


Present: abdominal pain, nausea Absent: abdominal distension, vomiting, diarrhea

, constipation, melena, hematochezia


GENITOURINARY: 


Absent: dysuria, frequency, urgency, hesitancy, hematuria, flank pain, genital 

pain


MUSCULOSKELETAL: 


Absent: myalgia, arthralgia, joint swelling


SKIN: 


Absent: rash, itching, pallor


HEMATOLOGIC/IMMUNOLOGIC: 


Absent: easy bleeding, easy bruising, lymphadenopathy, frequent infections


ENDOCRINE:


Absent: unexplained weight gain, unexplained weight loss, heat intolerance, 

cold intolerance


NEUROLOGIC: 


Absent: headache, focal weakness or paresthesias, dizziness, unsteady gait, 

seizure, mental status changes, bladder or bowel incontinence


PSYCHIATRIC: 


Absent: anxiety, depression, suicidal or homicidal ideation, hallucinations.


Is the patient limited English proficient: No





<Glenny Horan - Last Filed: 07/16/19 16:30>





*Physical Exam





- Vital Signs


 Last Vital Signs











Temp Pulse Resp BP Pulse Ox


 


 98.2 F   92 H  16   159/97   97 


 


 07/16/19 09:30  07/16/19 09:30  07/16/19 09:30  07/16/19 09:30  07/16/19 09:30














- Physical Exam


Comments: 





07/16/19 09:59


GENERAL:


Well developed, well nourished. Awake and alert. No acute distress.


HEENT:


Normocephalic, atraumatic. PERRLA, EOMI. No conjunctival pallor. Sclera are non-

icteric. Moist mucous membranes. Oropharynx is clear.


NECK: 


Supple. Full ROM. No JVD. Carotid pulses 2+ and symmetric, without bruits. No 

thyromegaly. No lymphadenopathy.


CARDIOVASCULAR:


Regular rate and rhythm. No murmurs, rubs, or gallops. Distal pulses are 2+ and 

symmetric. 


PULMONARY: 


No evidence of respiratory distress. Lungs clear to auscultation bilaterally. 

No wheezing, rales or rhonchi.


ABDOMINAL:


TTP of the epigastric region with light palpation, (+) aragon's sign. Soft. Non-

distended. No rebound or guarding. No organomegaly. Normoactive bowel sounds. 


MUSCULOSKELETAL 


Normal range of motion at all joints. No bony deformities or tenderness. No CVA 

tenderness.


EXTREMITIES: 


No cyanosis. No clubbing. No edema. No calf tenderness.


SKIN: 


Warm and dry. Normal capillary refill. No rashes. No jaundice. 


NEUROLOGICAL: 


Alert, awake, appropriate. Cranial nerves 2-12 intact. No deficits to light 

touch and temperature in face, upper extremities and lower extremities. No 

motor deficits in the in face, upper extremities and lower extremities. 

Normoreflexic in the upper and lower extremities. Normal speech. Toes are down-

going bilaterally. Gait is normal without ataxia.


PSYCHIATRIC: 


Cooperative. Good eye contact. Appropriate mood and affect.





<CharpavithraGlenny - Last Filed: 07/16/19 16:30>





- Vital Signs


 Last Vital Signs











Temp Pulse Resp BP Pulse Ox


 


 99.1 F   82   16   139/73   96 


 


 07/16/19 15:44  07/16/19 15:44  07/16/19 15:44  07/16/19 15:44  07/16/19 15:44














<Sindy Mendez - Last Filed: 07/19/19 09:18>





ED Treatment Course





- LABORATORY


CBC & Chemistry Diagram: 


 07/16/19 10:16





 07/16/19 10:16





<CharpavithraGlenny - Last Filed: 07/16/19 16:30>





- LABORATORY


CBC & Chemistry Diagram: 


 07/16/19 10:16





 07/16/19 10:16





- ADDITIONAL ORDERS


Additional order review: 


 











  07/16/19





  10:16


 


RBC  5.06


 


MCV  85.3


 


MCHC  34.6


 


RDW  14.0


 


MPV  8.1


 


Neutrophils %  78.9


 


Lymphocytes %  15.3  D


 


Monocytes %  4.5


 


Eosinophils %  0.6


 


Basophils %  0.7  D














- Medications


Given in the ED: 


ED Medications














Discontinued Medications














Generic Name Dose Route Start Last Admin





  Trade Name Kannan  PRN Reason Stop Dose Admin


 


Acetaminophen  1,000 mg  07/16/19 09:50  07/16/19 10:09





  Ofirmev Injection -  IVPB  07/16/19 09:51  1,000 mg





  ONCE ONE   Administration





     





     





     





     


 


Acetaminophen  1,000 mg  07/16/19 15:16  07/16/19 15:28





  Ofirmev Injection -  IVPB  07/16/19 15:17  1,000 mg





  ONCE ONE   Administration





     





     





     





     


 


Hydromorphone HCl  0.5 mg  07/16/19 10:34  07/16/19 10:39





  Dilaudid Injection -  IVPUSH  07/16/19 10:35  0.5 mg





  ONCE ONE   Administration





     





     





     





     


 


Hydromorphone HCl  0.5 mg  07/16/19 12:44  07/16/19 13:16





  Dilaudid Injection -  IVPUSH  07/16/19 12:45  0.5 mg





  ONCE ONE   Administration





     





     





     





     


 


Famotidine/Sodium Chloride  20 mg in 50 mls @ 100 mls/hr  07/16/19 09:51  07/16/ 19 10:09





  Pepcid 20 Mg Premixed Ivpb -  IVPB  07/16/19 10:20  100 mls/hr





  ONCE ONE   Administration





     





     





     





     


 


Sodium Chloride  1,000 mls @ 1,000 mls/hr  07/16/19 09:50  07/16/19 10:09





  Normal Saline -  IV  07/16/19 10:49  1,000 mls/hr





  ASDIR STA   Administration





     





     





     





     


 


Ketorolac Tromethamine  30 mg  07/16/19 16:31  07/16/19 16:47





  Toradol Injection -  IVPUSH  07/16/19 16:32  30 mg





  ONCE ONE   Administration





     





     





     





     


 


Ondansetron HCl  4 mg  07/16/19 09:51  07/16/19 10:09





  Zofran Injection  IVPUSH  07/16/19 09:52  4 mg





  ONCE ONE   Administration





     





     





     





     














<Sindy Mendez - Last Filed: 07/19/19 09:18>





Medical Decision Making





- Medical Decision Making





07/16/19 10:01


The patient is a 34-year-old male past medical history of GERD, gallstones, who 

presents to the ER today with epigastric pain and chest pain. He states this 

feels like his typical gallstone colic. He believes he ate something last night 

that was either fatty or spicy which upset his stomach this morning. He states 

that he initially thought he had reflux however the pain increased so he 

presented to the ER today. He is scheduled to have surgery to remove his 

gallbladder with Dr. Sung at [facility]. Denies fevers, chills, sore throat, cough, 

vomiting, constipation, diarrhea and urinary symptoms.





A/P: Epigastric pain


Differential diagnosis includes but is not limited to, biliary colic, 

cholecystitis, cholangitis, pancreatitis, gallstone pancreatitis, CBD 

obstruction


Very tender to palpation in the right upper quadrant/epigastric region with 

light and deep palpation as well as positive Aragon sign.


Basic labs, urine, EKG and right upper quadrant ultrasound ordered


IV fluids, Zofran and GI cocktail


Reevaluate





07/16/19 11:08


EKG: rate 79 BPM, NSR. Normal intervals and axis. No acute ST-T wave changes.


Lipase and WBC WNL





07/16/19 16:30


Gall stones seen without sludge or evidence of cholecystitis


7mm  r kidney stone seen in the distal ureter


mild hyrdo noted. No UTI, CR/BUN normal


Pain is controlled at this time. 


Likely cause of pain is the kidney stone rather than biliary colic


Stone unlikely to pass on its own but pain is controlled at this time


Will send patient to urology as an out patient


DC home


I discussed the physical exam findings, ancillary test results and final 

diagnoses with the patient. I answered all of the patient's questions. The 

patient was satisfied with the care received and felt comfortable with the 

discharge plan and treatment plan.  The Patient agrees to follow up with the 

primary care physician/specialist within 24-72 hours. Return precautions were 

given.











<Glenny Horan - Last Filed: 07/16/19 16:30>





*DC/Admit/Observation/Transfer





- Discharge Dispostion


Decision to Admit order: No





<Glenny Horan - Last Filed: 07/16/19 16:30>





- Attestations


Physician Attestion: 





I reviewed the case with the mid-level practitioner and agree with the mid-

level practitioner's assessment, diagnosis and disposition.








<Sindy Mendez - Last Filed: 07/19/19 09:18>


Diagnosis at time of Disposition: 


 Kidney calculi, Biliary colic








- Discharge Dispostion


Disposition: HOME


Condition at time of disposition: Stable





- Prescriptions


Prescriptions: 


Ibuprofen 800 mg PO TID #30 tablet


Oxycodone HCl/Acetaminophen [Percocet 5-325 mg Tablet] 1 tab PO Q6H #10 tablet 

MDD 4


Tamsulosin HCl [Flomax] 0.4 mg PO DAILY #10 capsule





- Referrals


Referrals: 


Jarrett Zaldivar MD [Staff Physician] - 





- Patient Instructions


Printed Discharge Instructions:  DI for Kidney Stones, DI for Prescription 

Opioid Use


Additional Instructions: 


You have a kidney stone as seen on your CAT scan today. You also have biliary 

colic.


Please follow up with a urologist this week as the stone was 7 mm.


Take the Flomax daily


Take the ibuprofen 800 mg every 8 hours as needed for pain starting tonight 

before bed.


He may take the Percocet as needed for breakthrough pain. Do not drink or drive 

after taking this medication as it may be too sleepy.


Follow-up with her general surgeon as well.





Return to the ER for worsening pain, fever, painful urination or if you have 

any changes in your symptoms.





- Post Discharge Activity


Forms/Work/School Notes:  Back to Work

## 2019-07-17 NOTE — EKG
Test Reason : 

Blood Pressure : ***/*** mmHG

Vent. Rate : 079 BPM     Atrial Rate : 079 BPM

   P-R Int : 168 ms          QRS Dur : 090 ms

    QT Int : 386 ms       P-R-T Axes : 034 070 020 degrees

   QTc Int : 442 ms

 

NORMAL SINUS RHYTHM

NORMAL ECG

WHEN COMPARED WITH ECG OF 22-APR-2019 07:29,

NO SIGNIFICANT CHANGE WAS FOUND

Confirmed by HARPAL CAMACHO MD ([id number]) on 7/17/2019 10:29:41 AM

 

Referred By:             Confirmed By:HARPAL CAMACHO MD

## 2021-05-11 ENCOUNTER — HOSPITAL ENCOUNTER (EMERGENCY)
Dept: HOSPITAL 74 - JERFT | Age: 36
Discharge: HOME | End: 2021-05-11
Payer: COMMERCIAL

## 2021-05-11 VITALS — SYSTOLIC BLOOD PRESSURE: 144 MMHG | DIASTOLIC BLOOD PRESSURE: 98 MMHG | TEMPERATURE: 98.1 F | HEART RATE: 92 BPM

## 2021-05-11 VITALS — BODY MASS INDEX: 33.4 KG/M2

## 2021-05-11 DIAGNOSIS — S61.215A: Primary | ICD-10-CM

## 2021-05-11 PROCEDURE — 3E0234Z INTRODUCTION OF SERUM, TOXOID AND VACCINE INTO MUSCLE, PERCUTANEOUS APPROACH: ICD-10-PCS
